# Patient Record
Sex: FEMALE | Race: WHITE | NOT HISPANIC OR LATINO | Employment: FULL TIME | URBAN - NONMETROPOLITAN AREA
[De-identification: names, ages, dates, MRNs, and addresses within clinical notes are randomized per-mention and may not be internally consistent; named-entity substitution may affect disease eponyms.]

---
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
## 2021-05-15 DIAGNOSIS — F41.9 ANXIETY: ICD-10-CM

## 2021-05-17 RX ORDER — FLUOXETINE HYDROCHLORIDE 20 MG/1
CAPSULE ORAL
Qty: 90 CAPSULE | Refills: 1 | Status: SHIPPED | OUTPATIENT
Start: 2021-05-17 | End: 2022-01-04

## 2021-05-17 NOTE — TELEPHONE ENCOUNTER
She will need an office visit to recheck her PHQ. See if she is home for the summer. She is a student in [place].

## 2021-12-29 DIAGNOSIS — F41.9 ANXIETY: ICD-10-CM

## 2022-01-04 RX ORDER — FLUOXETINE HYDROCHLORIDE 20 MG/1
CAPSULE ORAL
Qty: 90 CAPSULE | Refills: 1 | Status: SHIPPED | OUTPATIENT
Start: 2022-01-04 | End: 2022-08-25

## 2022-08-19 DIAGNOSIS — F41.9 ANXIETY: ICD-10-CM

## 2022-08-19 RX ORDER — FLUOXETINE HYDROCHLORIDE 20 MG/1
CAPSULE ORAL
Qty: 90 CAPSULE | Refills: 1 | OUTPATIENT
Start: 2022-08-19

## 2022-08-19 NOTE — TELEPHONE ENCOUNTER
Rx Refill Note  Requested Prescriptions     Pending Prescriptions Disp Refills   • FLUoxetine (PROzac) 20 MG capsule [Pharmacy Med Name: FLUOXETINE HCL 20 MG CAPSULE] 90 capsule 1     Sig: TAKE 1 CAPSULE BY MOUTH EVERY DAY      Last office visit with prescribing clinician: Visit date not found      Next office visit with prescribing clinician: Visit date not found            Nora Reynolds CMA  08/19/22, 07:09 CDT

## 2022-08-25 ENCOUNTER — TELEMEDICINE (OUTPATIENT)
Dept: INTERNAL MEDICINE | Facility: CLINIC | Age: 22
End: 2022-08-25

## 2022-08-25 DIAGNOSIS — F41.9 ANXIETY: Primary | ICD-10-CM

## 2022-08-25 PROCEDURE — 99213 OFFICE O/P EST LOW 20 MIN: CPT | Performed by: NURSE PRACTITIONER

## 2022-08-25 RX ORDER — FLUOXETINE HYDROCHLORIDE 40 MG/1
40 CAPSULE ORAL DAILY
Qty: 90 CAPSULE | Refills: 1 | Status: SHIPPED | OUTPATIENT
Start: 2022-08-25

## 2022-08-25 NOTE — PROGRESS NOTES
Subjective     Chief Complaint   Patient presents with   • Anxiety       History of Present Illness     Patient is a 23yo white female presenting today for medication refill. She states she has been on Prozac since approximately 2019 at 20mg. She states she previously tried Buspar 15mg but did not like it. She states she is still liking the Prozac but has been experiencing an increase in anxiety. She states she was wondering if she can increase her dose. Patient denies any additional complaints. Denies any side effects from Prozac.  She does admit that she has been involved in a situation that is been in the forefront of her thoughts for the last few months even though she is not directly involved.      Review of Systems   Otherwise complete ROS reviewed and negative except as mentioned in the HPI.    Past Medical History:   Past Medical History:   Diagnosis Date   • Anxiety      Past Surgical History:  Past Surgical History:   Procedure Laterality Date   • WISDOM TOOTH EXTRACTION       Social History:  reports that she has never smoked. She has never used smokeless tobacco. She reports that she does not drink alcohol and does not use drugs.    Family History: family history includes No Known Problems in her father and mother.      Allergies:  No Known Allergies  Medications:  Prior to Admission medications    Medication Sig Start Date End Date Taking? Authorizing Provider   amoxicillin (AMOXIL) 500 MG capsule Take 2 capsules by mouth 2 (Two) Times a Day. 7/1/20   Silvia Bedoya APRN   busPIRone (BUSPAR) 15 MG tablet Take 1 tablet by mouth 2 (Two) Times a Day for 90 days. 8/27/20 11/25/20  Silvia Bedoya APRN   doxycycline (MONODOX) 100 MG capsule Take 1 capsule by mouth 2 (Two) Times a Day. 6/4/20   Kat Taylor DO   FLUoxetine (PROzac) 20 MG capsule TAKE 1 CAPSULE BY MOUTH EVERY DAY 1/4/22   Silvia Bedoya APRN       Objective     Vital Signs: There were no vitals  taken for this visit.  Physical Exam  Constitutional:       Appearance: She is well-developed and well-nourished.   HENT:      Head: Normocephalic and atraumatic.   Pulmonary:      Effort: Pulmonary effort is normal.   Abdominal: System normal.   Musculoskeletal:      Cervical back: Normal range of motion.   Skin:     General: Skin is warm and dry.   Neurological:      Mental Status: She is alert.   Psychiatric:         Mood and Affect: Mood and affect normal.       Results Reviewed:  Glucose   Date Value Ref Range Status   07/01/2020 112 (H) 65 - 99 mg/dL Final     BUN   Date Value Ref Range Status   07/01/2020 14 6 - 20 mg/dL Final     Creatinine   Date Value Ref Range Status   07/01/2020 0.67 0.57 - 1.00 mg/dL Final     Sodium   Date Value Ref Range Status   07/01/2020 139 134 - 144 mmol/L Final     Potassium   Date Value Ref Range Status   07/01/2020 4.2 3.5 - 5.2 mmol/L Final     Chloride   Date Value Ref Range Status   07/01/2020 104 96 - 106 mmol/L Final     Total CO2   Date Value Ref Range Status   07/01/2020 21 20 - 29 mmol/L Final     Calcium   Date Value Ref Range Status   07/01/2020 9.9 8.7 - 10.2 mg/dL Final     ALT (SGPT)   Date Value Ref Range Status   07/01/2020 11 0 - 32 IU/L Final     AST (SGOT)   Date Value Ref Range Status   07/01/2020 19 0 - 40 IU/L Final     WBC   Date Value Ref Range Status   07/01/2020 7.3 3.4 - 10.8 x10E3/uL Final     Hematocrit   Date Value Ref Range Status   07/01/2020 42.6 34.0 - 46.6 % Final     Platelets   Date Value Ref Range Status   07/01/2020 255 150 - 450 x10E3/uL Final         Assessment / Plan     Assessment/Plan:  Diagnoses and all orders for this visit:    1. Anxiety (Primary)  -     FLUoxetine (PROzac) 40 MG capsule; Take 1 capsule by mouth Daily.  Dispense: 90 capsule; Refill: 1        No follow-ups on file. unless patient needs to be seen sooner or acute issues arise.    Code Status: Full.     I have discussed the patient results/orders and and  plan/recommendation with them at today's visit.      Silvia Bedoya, APRN   08/25/2022

## 2023-04-20 DIAGNOSIS — F41.9 ANXIETY: ICD-10-CM

## 2023-04-20 RX ORDER — FLUOXETINE HYDROCHLORIDE 40 MG/1
40 CAPSULE ORAL DAILY
Qty: 90 CAPSULE | Refills: 1 | Status: SHIPPED | OUTPATIENT
Start: 2023-04-20

## 2023-04-20 NOTE — TELEPHONE ENCOUNTER
[facility] PT       Rx Refill Note  Requested Prescriptions     Pending Prescriptions Disp Refills   • FLUoxetine (PROzac) 40 MG capsule 90 capsule 1     Sig: Take 1 capsule by mouth Daily.      Last office visit with prescribing clinician: 8/25/22   Next office visit with prescribing clinician: Visit date not found                         Would you like a call back once the refill request has been completed: [] Yes [] No    If the office needs to give you a call back, can they leave a voicemail: [] Yes [] No    Patrica Franklin RN  04/20/23, 12:48 CDT

## 2023-11-28 DIAGNOSIS — F41.9 ANXIETY: ICD-10-CM

## 2023-11-28 RX ORDER — FLUOXETINE HYDROCHLORIDE 40 MG/1
40 CAPSULE ORAL DAILY
Qty: 90 CAPSULE | Refills: 1 | Status: SHIPPED | OUTPATIENT
Start: 2023-11-28

## 2023-11-28 NOTE — TELEPHONE ENCOUNTER
Rx Refill Note  Requested Prescriptions     Pending Prescriptions Disp Refills    FLUoxetine (PROzac) 40 MG capsule 90 capsule 1     Sig: Take 1 capsule by mouth Daily.      Last office visit with prescribing clinician: Visit date not found   Last telemedicine visit with prescribing clinician: 8/25/2022  Next office visit with prescribing clinician: Visit date not found                         Would you like a call back once the refill request has been completed: [] Yes [] No    If the office needs to give you a call back, can they leave a voicemail: [] Yes [] No    Kay Mota MA  11/28/23, 10:15 CST

## 2024-06-02 DIAGNOSIS — F41.9 ANXIETY: ICD-10-CM

## 2024-06-03 ENCOUNTER — TELEPHONE (OUTPATIENT)
Dept: INTERNAL MEDICINE | Facility: CLINIC | Age: 24
End: 2024-06-03
Payer: COMMERCIAL

## 2024-06-03 RX ORDER — FLUOXETINE HYDROCHLORIDE 40 MG/1
40 CAPSULE ORAL DAILY
Qty: 90 CAPSULE | Refills: 1 | OUTPATIENT
Start: 2024-06-03

## 2024-06-03 NOTE — TELEPHONE ENCOUNTER
Pt called [facility] back and stated she lives in [place] now and Rena has been letting her do Video Visits.  Pt isn't able to come in for an appt right now without scheduling ahead of time and has 2 pills remaining.  Pt was hoping she can do another Mychart Video Visit and get her refills, then if Rena wants her to come in for a physical appt she can schedule for another date ahead of time.  If pt is required to have an appt, she is hoping Rena could send in 2 weeks worth of meds until she can get in for an appt.    Rena, please advise as pt needs refills.

## 2024-06-04 ENCOUNTER — TELEMEDICINE (OUTPATIENT)
Dept: INTERNAL MEDICINE | Facility: CLINIC | Age: 24
End: 2024-06-04
Payer: COMMERCIAL

## 2024-06-04 DIAGNOSIS — F41.9 ANXIETY: ICD-10-CM

## 2024-06-04 PROCEDURE — 99213 OFFICE O/P EST LOW 20 MIN: CPT | Performed by: NURSE PRACTITIONER

## 2024-06-04 RX ORDER — FLUOXETINE HYDROCHLORIDE 40 MG/1
40 CAPSULE ORAL DAILY
Qty: 90 CAPSULE | Refills: 1 | Status: SHIPPED | OUTPATIENT
Start: 2024-06-04

## 2024-06-04 NOTE — PROGRESS NOTES
"Chief Complaint  No chief complaint on file.    Duran Loera presents to [facility]  History of Present Illness  Video visit was obtained today Pt was in her parked car, provider was in clinic.  Patient comes in today stating that she is now moved to [place].  That has been about a year since I have seen her.  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  She does have a boyfriend and states that her anxiety is much improved after college.  She states she still has some anxiety is well-controlled Prozac is not having any suicidal ideations.  She does have an ice routine and states this has also.    Objective   Vital Signs:  There were no vitals taken for this visit.  Estimated body mass index is 20.42 kg/m² as calculated from the following:    Height as of 8/3/20: 160 cm (63\").    Weight as of 8/3/20: 52.3 kg (115 lb 4 oz).         Physical Exam  Constitutional:       Appearance: She is well-developed and well-nourished.   HENT:      Head: Normocephalic and atraumatic.   Eyes:      Conjunctiva/sclera: Conjunctivae normal.   Pulmonary:      Effort: Pulmonary effort is normal.   Musculoskeletal:      Cervical back: Normal range of motion.   Psychiatric:         Mood and Affect: Mood and affect normal.          Physical Exam   Constitutional: She appears well-developed and well-nourished.   HENT:   Head: Normocephalic and atraumatic.   Eyes: Conjunctivae are normal.   Pulmonary/Chest: Effort normal.   Psychiatric: She has a normal mood and affect.      Result Review :               Assessment and Plan     Diagnoses and all orders for this visit:    1. Anxiety  -     FLUoxetine (PROzac) 40 MG capsule; Take 1 capsule by mouth Daily.  Dispense: 90 capsule; Refill: 1    We did discuss that she ultimately needs a physical before the monitor.  I have given her enough medications to last for 6 months.  She will make a " trip home and come home we will accomplish this task and then discuss what to do further.  For now she is stable         Follow Up     No follow-ups on file.  Patient was given instructions and counseling regarding her condition or for health maintenance advice. Please see specific information pulled into the AVS if appropriate.

## 2024-12-06 DIAGNOSIS — F41.9 ANXIETY: ICD-10-CM

## 2024-12-06 RX ORDER — FLUOXETINE 40 MG/1
40 CAPSULE ORAL DAILY
Qty: 90 CAPSULE | Refills: 1 | Status: SHIPPED | OUTPATIENT
Start: 2024-12-06

## 2024-12-06 NOTE — TELEPHONE ENCOUNTER
Rx Refill Note  Requested Prescriptions     Pending Prescriptions Disp Refills    FLUoxetine (PROzac) 40 MG capsule [Pharmacy Med Name: FLUOXETINE HCL 40 MG CAPSULE] 90 capsule 1     Sig: TAKE 1 CAPSULE BY MOUTH EVERY DAY      Last office visit with prescribing clinician: 6/4/24  Last telemedicine visit with prescribing clinician: 6/4/24   Next office visit with prescribing clinician: Visit date not found                         Would you like a call back once the refill request has been completed: [] Yes [] No    If the office needs to give you a call back, can they leave a voicemail: [] Yes [] No    Patrica Franklin RN  12/06/24, 07:14 CST